# Patient Record
Sex: FEMALE | Race: WHITE | NOT HISPANIC OR LATINO | URBAN - NONMETROPOLITAN AREA
[De-identification: names, ages, dates, MRNs, and addresses within clinical notes are randomized per-mention and may not be internally consistent; named-entity substitution may affect disease eponyms.]

---

## 2024-02-21 ENCOUNTER — OFFICE (OUTPATIENT)
Dept: URBAN - NONMETROPOLITAN AREA CLINIC 1 | Facility: CLINIC | Age: 34
End: 2024-02-21
Payer: COMMERCIAL

## 2024-02-21 VITALS
DIASTOLIC BLOOD PRESSURE: 74 MMHG | SYSTOLIC BLOOD PRESSURE: 110 MMHG | WEIGHT: 293 LBS | HEIGHT: 62 IN | HEART RATE: 82 BPM

## 2024-02-21 DIAGNOSIS — K31.89 OTHER DISEASES OF STOMACH AND DUODENUM: ICD-10-CM

## 2024-02-21 DIAGNOSIS — R93.3 ABNORMAL FINDINGS ON DIAGNOSTIC IMAGING OF OTHER PARTS OF DI: ICD-10-CM

## 2024-02-21 DIAGNOSIS — K21.9 GASTRO-ESOPHAGEAL REFLUX DISEASE WITHOUT ESOPHAGITIS: ICD-10-CM

## 2024-02-21 DIAGNOSIS — I10 ESSENTIAL (PRIMARY) HYPERTENSION: ICD-10-CM

## 2024-02-21 DIAGNOSIS — Z98.84 BARIATRIC SURGERY STATUS: ICD-10-CM

## 2024-02-21 PROCEDURE — 99204 OFFICE O/P NEW MOD 45 MIN: CPT | Performed by: NURSE PRACTITIONER

## 2024-02-21 NOTE — SERVICEHPINOTES
A 33 year old female with a history of HTN and gastric sleeve surgery 12/2023,  referred to us for evaluation of transaminitis and abnormal imaging.  It appears that she saw [facility] for routine follow up of hypertension, and was noted to have elevated liver labs.  01/23/2024: AST 50, .     01/29/2024: AST 67, , alk-phos and bilirubin have been normal.br
br Abdominal ultrasound 02/02/2024: brImpression-brHepatomegaly with hepatic steatosis. 6 mm gallbladder polyp. Recommend follow-up gallbladder ultrasound in 6-12 months. Indeterminate simple appearing fluid collection immediately anterior to the head of the pancreas near the niki hepatis. This is of uncertain etiology and could potentially represent a pancreatic cyst or pseudocyst if there has been prior pancreatitis. Recommend abdominal CT with contrast for further evaluation.br 
br CT abdomen and pelvis with contrast 02/13/2024: brImpression- no acute abnormality. 5 x 2.7 cm mass along the greater curvature of the stomach. Differential considerations include postop change, residual hematoma, atypical pseudocyst, and GIST. nonobstructing left renal stone.
br
br    She has not had any abdominal pain, jaundice, or unusual bowel movements. She takes omeprazole for GERD.   She does not use alcohol or Tylenol regularly.  She has been told that she has fatty liver, and is working to achieve a healthy BMI.

## 2024-02-21 NOTE — SERVICENOTES
The risks for EGD were discussed with her and she agrees to proceed.  
Follow-up office visit after procedure.